# Patient Record
Sex: FEMALE | Race: WHITE | Employment: FULL TIME | URBAN - METROPOLITAN AREA
[De-identification: names, ages, dates, MRNs, and addresses within clinical notes are randomized per-mention and may not be internally consistent; named-entity substitution may affect disease eponyms.]

---

## 2023-03-16 DIAGNOSIS — E03.9 HYPOTHYROIDISM, UNSPECIFIED TYPE: Primary | ICD-10-CM

## 2023-03-16 RX ORDER — LEVOTHYROXINE SODIUM 25 UG/1
TABLET ORAL
Qty: 90 TABLET | Refills: 0 | Status: SHIPPED | OUTPATIENT
Start: 2023-03-16

## 2023-07-12 ENCOUNTER — OFFICE VISIT (OUTPATIENT)
Dept: PRIMARY CARE | Facility: CLINIC | Age: 53
End: 2023-07-12
Payer: COMMERCIAL

## 2023-07-12 VITALS
BODY MASS INDEX: 30.59 KG/M2 | HEIGHT: 60 IN | SYSTOLIC BLOOD PRESSURE: 102 MMHG | DIASTOLIC BLOOD PRESSURE: 80 MMHG | OXYGEN SATURATION: 98 % | TEMPERATURE: 97.9 F | WEIGHT: 155.8 LBS | HEART RATE: 73 BPM

## 2023-07-12 DIAGNOSIS — Z00.00 ROUTINE GENERAL MEDICAL EXAMINATION AT A HEALTH CARE FACILITY: Primary | ICD-10-CM

## 2023-07-12 PROBLEM — E03.9 HYPOTHYROIDISM: Status: ACTIVE | Noted: 2023-07-12

## 2023-07-12 PROBLEM — M10.9 GOUT: Status: ACTIVE | Noted: 2023-07-12

## 2023-07-12 PROCEDURE — 99396 PREV VISIT EST AGE 40-64: CPT | Performed by: FAMILY MEDICINE

## 2023-07-12 PROCEDURE — 1036F TOBACCO NON-USER: CPT | Performed by: FAMILY MEDICINE

## 2023-07-12 ASSESSMENT — ENCOUNTER SYMPTOMS
DYSURIA: 0
DIARRHEA: 0
NAUSEA: 0
NERVOUS/ANXIOUS: 0
PALPITATIONS: 0
HEADACHES: 0
DIZZINESS: 0
SLEEP DISTURBANCE: 0
ARTHRALGIAS: 0
CONSTIPATION: 0
FATIGUE: 0
LIGHT-HEADEDNESS: 0
SHORTNESS OF BREATH: 0
JOINT SWELLING: 0
ACTIVITY CHANGE: 0
VOMITING: 0
WOUND: 0
DYSPHORIC MOOD: 0
EYE PAIN: 0
APPETITE CHANGE: 0
SINUS PRESSURE: 0
BLOOD IN STOOL: 0
ABDOMINAL PAIN: 0
COUGH: 0

## 2023-07-12 ASSESSMENT — PATIENT HEALTH QUESTIONNAIRE - PHQ9
2. FEELING DOWN, DEPRESSED OR HOPELESS: NOT AT ALL
1. LITTLE INTEREST OR PLEASURE IN DOING THINGS: NOT AT ALL
SUM OF ALL RESPONSES TO PHQ9 QUESTIONS 1 AND 2: 0

## 2023-07-12 NOTE — PROGRESS NOTES
"Subjective   Patient ID: Debo Cisneros is a 53 y.o. female who presents for Annual Exam.    HPI   Pain mgmt- Dr. Gordon - following for disc issues and SI joint issues over the last few years - now prn- no recent visit   OB- dr sharma- last seen 6 months ago - pap and mammogram utd   Derm- [place]    Past medical surgical family and social hx updated in chart    Yoga, piliates, walking daily   Healthy diet, rare alcohol   Feeling good     Hypothyroidism  States she had recent labs by her OB/GYN that were within normal limits     Review of Systems   Constitutional:  Negative for activity change, appetite change and fatigue.   HENT:  Negative for congestion, postnasal drip and sinus pressure.    Eyes:  Negative for pain and visual disturbance.   Respiratory:  Negative for cough and shortness of breath.    Cardiovascular:  Negative for chest pain, palpitations and leg swelling.   Gastrointestinal:  Negative for abdominal pain, blood in stool, constipation, diarrhea, nausea and vomiting.   Endocrine: Negative for cold intolerance and heat intolerance.   Genitourinary:  Negative for dysuria and menstrual problem.   Musculoskeletal:  Negative for arthralgias and joint swelling.   Skin:  Negative for rash and wound.   Neurological:  Negative for dizziness, light-headedness and headaches.   Psychiatric/Behavioral:  Negative for dysphoric mood and sleep disturbance. The patient is not nervous/anxious.        Objective   /80   Pulse 73   Temp 36.6 °C (97.9 °F)   Ht 1.53 m (5' 0.25\")   Wt 70.7 kg (155 lb 12.8 oz)   SpO2 98%   BMI 30.18 kg/m²     Physical Exam  Vitals and nursing note reviewed.   Constitutional:       Appearance: Normal appearance.   HENT:      Head: Normocephalic and atraumatic.      Right Ear: Tympanic membrane, ear canal and external ear normal.      Left Ear: Tympanic membrane, ear canal and external ear normal.      Nose: Nose normal.      Mouth/Throat:      Mouth: Mucous membranes are moist. "      Pharynx: Oropharynx is clear.   Eyes:      Extraocular Movements: Extraocular movements intact.      Conjunctiva/sclera: Conjunctivae normal.      Pupils: Pupils are equal, round, and reactive to light.   Cardiovascular:      Rate and Rhythm: Normal rate and regular rhythm.      Pulses: Normal pulses.      Heart sounds: Normal heart sounds. No murmur heard.     No friction rub. No gallop.   Pulmonary:      Effort: Pulmonary effort is normal. No respiratory distress.      Breath sounds: Normal breath sounds.   Abdominal:      General: Abdomen is flat. Bowel sounds are normal.      Palpations: Abdomen is soft. There is no mass.      Tenderness: There is no abdominal tenderness. There is no rebound.   Musculoskeletal:         General: No swelling or deformity. Normal range of motion.      Cervical back: Normal range of motion and neck supple.   Lymphadenopathy:      Cervical: No cervical adenopathy.   Skin:     General: Skin is warm and dry.      Capillary Refill: Capillary refill takes less than 2 seconds.      Findings: No rash.   Neurological:      General: No focal deficit present.      Mental Status: She is alert and oriented to person, place, and time. Mental status is at baseline.      Cranial Nerves: No cranial nerve deficit.      Gait: Gait normal.      Deep Tendon Reflexes: Reflexes normal.   Psychiatric:         Mood and Affect: Mood normal.         Behavior: Behavior normal.         Thought Content: Thought content normal.         Judgment: Judgment normal.         Assessment/Plan   1. Routine general medical examination at a health care facility  update labs as elow above   Patient overall healthy weight and blood pressure  vaccines-will consider shingles  colonoscopy done 2019- next due 2029  mammogram and PAP UTD by gyn   Discussed age-appropriate anticipatory guidance  Encouraged yearly follow-up for full physical or sooner if needed  - CBC and Auto Differential; Future  - Lipid Panel; Future  -  Comprehensive Metabolic Panel; Future      Eugenio Byrd, DO

## 2023-07-12 NOTE — PATIENT INSTRUCTIONS
Recommendations for women annual wellness exam:  Make sure screenings for cervical, breast, and colon cancer are up to date if applicable- pap smears age 21-65, discuss mammogram starting at age 40, colonoscopy at age 45, earlier if positive family history for breast or colon cancer  Screen for osteoporosis with DXA bone scan starting at age 65 or sooner if risk factors present (long term steroid use, smoking, heavy alcohol use, history of fracture, rheumatoid arthritis, low body weight, family history of hip fracture)  Screening for lung cancer with low-dose CT in all adults age 55-77 who have a 30 pack-year smoking history and currently smoke or have quit within the past 15 years  Follow a healthy diet (Dash diet, Mediterranean diet)  Exercise 150 min/wk  Maintain healthy weight (BMI < 25)  Do not smoke  Alcohol in moderation (up to 1 drink/day)  Get enough sleep (7-8 hours/night)  Make sure immunizations are up to date (influenza, pneumococcal, Tdap, shingles if age > 50)  Postmenopausal women or women with osteoporosis need minimum 1,200 mg calcium and 800 IU vitamin D daily  Talk to your physician if you have concerns about depression or anxiety  Visit dentist twice yearly

## 2023-09-21 ENCOUNTER — LAB (OUTPATIENT)
Dept: LAB | Facility: LAB | Age: 53
End: 2023-09-21
Payer: COMMERCIAL

## 2023-09-21 DIAGNOSIS — Z00.00 ROUTINE GENERAL MEDICAL EXAMINATION AT A HEALTH CARE FACILITY: ICD-10-CM

## 2023-09-21 LAB
ALANINE AMINOTRANSFERASE (SGPT) (U/L) IN SER/PLAS: 22 U/L (ref 7–45)
ALBUMIN (G/DL) IN SER/PLAS: 4.8 G/DL (ref 3.4–5)
ALKALINE PHOSPHATASE (U/L) IN SER/PLAS: 70 U/L (ref 33–110)
ANION GAP IN SER/PLAS: 15 MMOL/L (ref 10–20)
ASPARTATE AMINOTRANSFERASE (SGOT) (U/L) IN SER/PLAS: 22 U/L (ref 9–39)
BASOPHILS (10*3/UL) IN BLOOD BY AUTOMATED COUNT: 0.06 X10E9/L (ref 0–0.1)
BASOPHILS/100 LEUKOCYTES IN BLOOD BY AUTOMATED COUNT: 0.9 % (ref 0–2)
BILIRUBIN TOTAL (MG/DL) IN SER/PLAS: 0.7 MG/DL (ref 0–1.2)
CALCIUM (MG/DL) IN SER/PLAS: 9.8 MG/DL (ref 8.6–10.3)
CARBON DIOXIDE, TOTAL (MMOL/L) IN SER/PLAS: 22 MMOL/L (ref 21–32)
CHLORIDE (MMOL/L) IN SER/PLAS: 108 MMOL/L (ref 98–107)
CHOLESTEROL (MG/DL) IN SER/PLAS: 215 MG/DL (ref 0–199)
CHOLESTEROL IN HDL (MG/DL) IN SER/PLAS: 77.6 MG/DL
CHOLESTEROL/HDL RATIO: 2.8
CREATININE (MG/DL) IN SER/PLAS: 0.82 MG/DL (ref 0.5–1.05)
EOSINOPHILS (10*3/UL) IN BLOOD BY AUTOMATED COUNT: 0.08 X10E9/L (ref 0–0.7)
EOSINOPHILS/100 LEUKOCYTES IN BLOOD BY AUTOMATED COUNT: 1.2 % (ref 0–6)
ERYTHROCYTE DISTRIBUTION WIDTH (RATIO) BY AUTOMATED COUNT: 12.1 % (ref 11.5–14.5)
ERYTHROCYTE MEAN CORPUSCULAR HEMOGLOBIN CONCENTRATION (G/DL) BY AUTOMATED: 34.1 G/DL (ref 32–36)
ERYTHROCYTE MEAN CORPUSCULAR VOLUME (FL) BY AUTOMATED COUNT: 98 FL (ref 80–100)
ERYTHROCYTES (10*6/UL) IN BLOOD BY AUTOMATED COUNT: 4.14 X10E12/L (ref 4–5.2)
GFR FEMALE: 85 ML/MIN/1.73M2
GLUCOSE (MG/DL) IN SER/PLAS: 96 MG/DL (ref 74–99)
HEMATOCRIT (%) IN BLOOD BY AUTOMATED COUNT: 40.5 % (ref 36–46)
HEMOGLOBIN (G/DL) IN BLOOD: 13.8 G/DL (ref 12–16)
IMMATURE GRANULOCYTES/100 LEUKOCYTES IN BLOOD BY AUTOMATED COUNT: 0.3 % (ref 0–0.9)
LDL: 121 MG/DL (ref 0–99)
LEUKOCYTES (10*3/UL) IN BLOOD BY AUTOMATED COUNT: 6.6 X10E9/L (ref 4.4–11.3)
LYMPHOCYTES (10*3/UL) IN BLOOD BY AUTOMATED COUNT: 2.63 X10E9/L (ref 1.2–4.8)
LYMPHOCYTES/100 LEUKOCYTES IN BLOOD BY AUTOMATED COUNT: 39.9 % (ref 13–44)
MONOCYTES (10*3/UL) IN BLOOD BY AUTOMATED COUNT: 0.48 X10E9/L (ref 0.1–1)
MONOCYTES/100 LEUKOCYTES IN BLOOD BY AUTOMATED COUNT: 7.3 % (ref 2–10)
NEUTROPHILS (10*3/UL) IN BLOOD BY AUTOMATED COUNT: 3.32 X10E9/L (ref 1.2–7.7)
NEUTROPHILS/100 LEUKOCYTES IN BLOOD BY AUTOMATED COUNT: 50.4 % (ref 40–80)
PLATELETS (10*3/UL) IN BLOOD AUTOMATED COUNT: 325 X10E9/L (ref 150–450)
POTASSIUM (MMOL/L) IN SER/PLAS: 4.1 MMOL/L (ref 3.5–5.3)
PROTEIN TOTAL: 7.6 G/DL (ref 6.4–8.2)
SODIUM (MMOL/L) IN SER/PLAS: 141 MMOL/L (ref 136–145)
TRIGLYCERIDE (MG/DL) IN SER/PLAS: 83 MG/DL (ref 0–149)
UREA NITROGEN (MG/DL) IN SER/PLAS: 13 MG/DL (ref 6–23)
VLDL: 17 MG/DL (ref 0–40)

## 2023-09-21 PROCEDURE — 80061 LIPID PANEL: CPT

## 2023-09-21 PROCEDURE — 85025 COMPLETE CBC W/AUTO DIFF WBC: CPT

## 2023-09-21 PROCEDURE — 36415 COLL VENOUS BLD VENIPUNCTURE: CPT

## 2023-09-21 PROCEDURE — 80053 COMPREHEN METABOLIC PANEL: CPT

## 2023-10-03 ENCOUNTER — TELEPHONE (OUTPATIENT)
Dept: PRIMARY CARE | Facility: CLINIC | Age: 53
End: 2023-10-03
Payer: COMMERCIAL

## 2023-11-09 ENCOUNTER — APPOINTMENT (OUTPATIENT)
Dept: PRIMARY CARE | Facility: CLINIC | Age: 53
End: 2023-11-09
Payer: COMMERCIAL

## 2024-02-23 ENCOUNTER — TELEPHONE (OUTPATIENT)
Dept: PRIMARY CARE | Facility: CLINIC | Age: 54
End: 2024-02-23
Payer: COMMERCIAL

## 2024-02-23 NOTE — TELEPHONE ENCOUNTER
----- Message from Eugenio Byrd DO sent at 2/23/2024  6:21 AM EST -----  Regarding: FW: Bloodwork/Test request  Contact: [phone]  Please offer pt a visit to discuss get this done and evaluating her symptoms   ----- Message -----  From: Tri Cota MA  Sent: 2/22/2024  11:58 AM EST  To: Eugenio Byrd DO  Subject: FW: Bloodwork/Test request                         ----- Message -----  From: Debo Cisneros  Sent: 2/22/2024  11:57 AM EST  To:  [id number] [place] [id number] Clinical Support Staff  Subject: Bloodwork/Test request                           Hi Dr. Byrd- I met with a local naturopath about my sudden weight gain and hormones. She recommended that I have the following tests and I was wondering if you could order these sooner rather than later because my next annual appointment with you isn’t until July:  CMP, CBC, Thyroid (TSH, TOT, T3,T4), Insulin, cholesterol,Vitamin D, AM cortisol, hscpp and testosterone free and total but I think you may not be able to do that one. If there are any others listed that you cannot do, just let me know. Additionally I mentioned to her that I’ve been having heart palpitations from time to time mostly at night when I lie down. Especially if I’ve had any alcohol… (a drink with dinner, not like out partying lol) which I’ve not had any alcohol for a couple of months but sometimes experiencing still at night. She asked that I let you know about this. Her name is Dr. Sumaya Sen. Thanks for any feedback you can provide! - Debo WEAVER

## 2024-04-29 ENCOUNTER — OFFICE VISIT (OUTPATIENT)
Dept: PRIMARY CARE | Facility: CLINIC | Age: 54
End: 2024-04-29
Payer: COMMERCIAL

## 2024-04-29 VITALS
BODY MASS INDEX: 32.23 KG/M2 | TEMPERATURE: 97.8 F | SYSTOLIC BLOOD PRESSURE: 120 MMHG | WEIGHT: 166.4 LBS | DIASTOLIC BLOOD PRESSURE: 80 MMHG

## 2024-04-29 DIAGNOSIS — E03.9 HYPOTHYROIDISM, UNSPECIFIED TYPE: Primary | ICD-10-CM

## 2024-04-29 DIAGNOSIS — R63.5 WEIGHT GAIN: ICD-10-CM

## 2024-04-29 PROCEDURE — 1036F TOBACCO NON-USER: CPT | Performed by: FAMILY MEDICINE

## 2024-04-29 PROCEDURE — 99214 OFFICE O/P EST MOD 30 MIN: CPT | Performed by: FAMILY MEDICINE

## 2024-04-29 ASSESSMENT — PATIENT HEALTH QUESTIONNAIRE - PHQ9
2. FEELING DOWN, DEPRESSED OR HOPELESS: NOT AT ALL
1. LITTLE INTEREST OR PLEASURE IN DOING THINGS: NOT AT ALL
2. FEELING DOWN, DEPRESSED OR HOPELESS: SEVERAL DAYS
SUM OF ALL RESPONSES TO PHQ9 QUESTIONS 1 AND 2: 0
1. LITTLE INTEREST OR PLEASURE IN DOING THINGS: NOT AT ALL
SUM OF ALL RESPONSES TO PHQ9 QUESTIONS 1 AND 2: 1

## 2024-04-29 ASSESSMENT — ENCOUNTER SYMPTOMS
SHORTNESS OF BREATH: 0
BLOOD IN STOOL: 0
WOUND: 0
HEADACHES: 0
SLEEP DISTURBANCE: 0
SINUS PRESSURE: 0
APPETITE CHANGE: 0
UNEXPECTED WEIGHT CHANGE: 1
EYE PAIN: 0
CONSTIPATION: 0
DYSPHORIC MOOD: 1
NERVOUS/ANXIOUS: 0
DYSURIA: 0
COUGH: 0
NAUSEA: 0
VOMITING: 0
FATIGUE: 0
JOINT SWELLING: 0
PALPITATIONS: 1
ACTIVITY CHANGE: 0
ABDOMINAL PAIN: 0
LIGHT-HEADEDNESS: 0
ARTHRALGIAS: 0
DIARRHEA: 0
DIZZINESS: 0

## 2024-04-29 NOTE — PROGRESS NOTES
Subjective   Patient ID: Debo Cisneros is a 53 y.o. female who presents for Weight Check (Recent weight gain).    HPI   Pain mgmt- Dr. Gordon - following for disc issues and SI joint issues over the last few years - now prn- no recent visit   OB- dr sharma- menopausal since 2017  Derm- [place]   Naturopath- had recent testing for menopausal support - on supplements for adrenals and menopause for a few weeks    Past medical surgical family and social hx updated in chart    Issues with wt gain   Has gained about 10 lbs last year  Motivation to workout has not been as good - doing yoga 2 times a week and walking the dog   Healthy diet, rare alcohol - no change recently   Tries to drink water   Coffee with almond milk no soda   Has had some increased stress and lack of motivation. Does not feel she is depressed but has been a little down over winter     Hypothyroidism  Taking levothyroxine 25 mcg every day     Review of Systems   Constitutional:  Positive for unexpected weight change. Negative for activity change, appetite change and fatigue.   HENT:  Negative for congestion, postnasal drip and sinus pressure.    Eyes:  Negative for pain and visual disturbance.   Respiratory:  Negative for cough and shortness of breath.    Cardiovascular:  Positive for palpitations (on occasion at rest after lg meal). Negative for chest pain and leg swelling.   Gastrointestinal:  Negative for abdominal pain, blood in stool, constipation, diarrhea, nausea and vomiting.   Endocrine: Negative for cold intolerance and heat intolerance.   Genitourinary:  Negative for dysuria and menstrual problem.   Musculoskeletal:  Negative for arthralgias and joint swelling.   Skin:  Negative for rash and wound.   Neurological:  Negative for dizziness, light-headedness and headaches.   Psychiatric/Behavioral:  Positive for dysphoric mood. Negative for sleep disturbance. The patient is not nervous/anxious.        Objective   /80   Temp 36.6 °C (97.8  °F)   Wt 75.5 kg (166 lb 6.4 oz)   BMI 32.23 kg/m²     Physical Exam  Vitals and nursing note reviewed.   Constitutional:       Appearance: Normal appearance.   HENT:      Head: Normocephalic and atraumatic.      Right Ear: Tympanic membrane, ear canal and external ear normal.      Left Ear: Tympanic membrane, ear canal and external ear normal.      Nose: Nose normal.      Mouth/Throat:      Mouth: Mucous membranes are moist.      Pharynx: Oropharynx is clear.   Eyes:      Extraocular Movements: Extraocular movements intact.      Conjunctiva/sclera: Conjunctivae normal.      Pupils: Pupils are equal, round, and reactive to light.   Cardiovascular:      Rate and Rhythm: Normal rate and regular rhythm.      Pulses: Normal pulses.      Heart sounds: Normal heart sounds. No murmur heard.     No friction rub. No gallop.   Pulmonary:      Effort: Pulmonary effort is normal. No respiratory distress.      Breath sounds: Normal breath sounds.   Abdominal:      General: Abdomen is flat. Bowel sounds are normal.      Palpations: Abdomen is soft. There is no mass.      Tenderness: There is no abdominal tenderness. There is no rebound.   Musculoskeletal:         General: No swelling or deformity. Normal range of motion.      Cervical back: Normal range of motion and neck supple.   Lymphadenopathy:      Cervical: No cervical adenopathy.   Skin:     General: Skin is warm and dry.      Capillary Refill: Capillary refill takes less than 2 seconds.      Findings: No rash.   Neurological:      General: No focal deficit present.      Mental Status: She is alert and oriented to person, place, and time. Mental status is at baseline.      Cranial Nerves: No cranial nerve deficit.      Gait: Gait normal.      Deep Tendon Reflexes: Reflexes normal.   Psychiatric:         Mood and Affect: Mood normal.         Behavior: Behavior normal.         Thought Content: Thought content normal.         Judgment: Judgment normal.          Assessment/Plan   1. Hypothyroidism, unspecified type  Lab due fu tbd   - TSH with reflex to Free T4 if abnormal; Future    2. Weight gain  Check labs fu tbd   - CBC and Auto Differential; Future  - Lipid Panel; Future  - Comprehensive Metabolic Panel; Future  - Hemoglobin A1C; Future        Eugenio Byrd, DO

## 2024-04-30 ENCOUNTER — LAB (OUTPATIENT)
Dept: LAB | Facility: LAB | Age: 54
End: 2024-04-30
Payer: COMMERCIAL

## 2024-04-30 DIAGNOSIS — E03.9 HYPOTHYROIDISM, UNSPECIFIED TYPE: ICD-10-CM

## 2024-04-30 DIAGNOSIS — R63.5 WEIGHT GAIN: ICD-10-CM

## 2024-04-30 LAB
ALBUMIN SERPL BCP-MCNC: 4.3 G/DL (ref 3.4–5)
ALP SERPL-CCNC: 76 U/L (ref 33–110)
ALT SERPL W P-5'-P-CCNC: 19 U/L (ref 7–45)
ANION GAP SERPL CALC-SCNC: 10 MMOL/L (ref 10–20)
AST SERPL W P-5'-P-CCNC: 19 U/L (ref 9–39)
BASOPHILS # BLD AUTO: 0.07 X10*3/UL (ref 0–0.1)
BASOPHILS NFR BLD AUTO: 0.9 %
BILIRUB SERPL-MCNC: 0.4 MG/DL (ref 0–1.2)
BUN SERPL-MCNC: 16 MG/DL (ref 6–23)
CALCIUM SERPL-MCNC: 9.4 MG/DL (ref 8.6–10.3)
CHLORIDE SERPL-SCNC: 109 MMOL/L (ref 98–107)
CHOLEST SERPL-MCNC: 173 MG/DL (ref 0–199)
CHOLESTEROL/HDL RATIO: 2.7
CO2 SERPL-SCNC: 26 MMOL/L (ref 21–32)
CREAT SERPL-MCNC: 0.74 MG/DL (ref 0.5–1.05)
EGFRCR SERPLBLD CKD-EPI 2021: >90 ML/MIN/1.73M*2
EOSINOPHIL # BLD AUTO: 0.16 X10*3/UL (ref 0–0.7)
EOSINOPHIL NFR BLD AUTO: 2.1 %
ERYTHROCYTE [DISTWIDTH] IN BLOOD BY AUTOMATED COUNT: 12.5 % (ref 11.5–14.5)
GLUCOSE SERPL-MCNC: 85 MG/DL (ref 74–99)
HCT VFR BLD AUTO: 40.4 % (ref 36–46)
HDLC SERPL-MCNC: 63.4 MG/DL
HGB BLD-MCNC: 13.2 G/DL (ref 12–16)
IMM GRANULOCYTES # BLD AUTO: 0.04 X10*3/UL (ref 0–0.7)
IMM GRANULOCYTES NFR BLD AUTO: 0.5 % (ref 0–0.9)
LDLC SERPL CALC-MCNC: 93 MG/DL
LYMPHOCYTES # BLD AUTO: 2.79 X10*3/UL (ref 1.2–4.8)
LYMPHOCYTES NFR BLD AUTO: 36 %
MCH RBC QN AUTO: 32.7 PG (ref 26–34)
MCHC RBC AUTO-ENTMCNC: 32.7 G/DL (ref 32–36)
MCV RBC AUTO: 100 FL (ref 80–100)
MONOCYTES # BLD AUTO: 0.58 X10*3/UL (ref 0.1–1)
MONOCYTES NFR BLD AUTO: 7.5 %
NEUTROPHILS # BLD AUTO: 4.12 X10*3/UL (ref 1.2–7.7)
NEUTROPHILS NFR BLD AUTO: 53 %
NON HDL CHOLESTEROL: 110 MG/DL (ref 0–149)
NRBC BLD-RTO: 0 /100 WBCS (ref 0–0)
PLATELET # BLD AUTO: 333 X10*3/UL (ref 150–450)
POTASSIUM SERPL-SCNC: 4.3 MMOL/L (ref 3.5–5.3)
PROT SERPL-MCNC: 7.1 G/DL (ref 6.4–8.2)
RBC # BLD AUTO: 4.04 X10*6/UL (ref 4–5.2)
SODIUM SERPL-SCNC: 141 MMOL/L (ref 136–145)
TRIGL SERPL-MCNC: 83 MG/DL (ref 0–149)
TSH SERPL-ACNC: 2.48 MIU/L (ref 0.44–3.98)
VLDL: 17 MG/DL (ref 0–40)
WBC # BLD AUTO: 7.8 X10*3/UL (ref 4.4–11.3)

## 2024-04-30 PROCEDURE — 85025 COMPLETE CBC W/AUTO DIFF WBC: CPT

## 2024-04-30 PROCEDURE — 36415 COLL VENOUS BLD VENIPUNCTURE: CPT

## 2024-04-30 PROCEDURE — 83036 HEMOGLOBIN GLYCOSYLATED A1C: CPT

## 2024-04-30 PROCEDURE — 80061 LIPID PANEL: CPT

## 2024-04-30 PROCEDURE — 84443 ASSAY THYROID STIM HORMONE: CPT

## 2024-04-30 PROCEDURE — 80053 COMPREHEN METABOLIC PANEL: CPT

## 2024-05-02 LAB
EST. AVERAGE GLUCOSE BLD GHB EST-MCNC: 108 MG/DL
HBA1C MFR BLD: 5.4 %

## 2024-07-17 ENCOUNTER — APPOINTMENT (OUTPATIENT)
Dept: PRIMARY CARE | Facility: CLINIC | Age: 54
End: 2024-07-17
Payer: COMMERCIAL

## 2024-08-07 ENCOUNTER — APPOINTMENT (OUTPATIENT)
Dept: PRIMARY CARE | Facility: CLINIC | Age: 54
End: 2024-08-07
Payer: COMMERCIAL

## 2024-08-31 ENCOUNTER — TELEMEDICINE (OUTPATIENT)
Dept: PRIMARY CARE | Facility: CLINIC | Age: 54
End: 2024-08-31
Payer: COMMERCIAL

## 2024-08-31 DIAGNOSIS — U07.1 COVID: Primary | ICD-10-CM

## 2024-08-31 PROCEDURE — 99214 OFFICE O/P EST MOD 30 MIN: CPT | Performed by: FAMILY MEDICINE

## 2024-08-31 RX ORDER — BENZONATATE 200 MG/1
200 CAPSULE ORAL 3 TIMES DAILY PRN
Qty: 42 CAPSULE | Refills: 0 | Status: SHIPPED | OUTPATIENT
Start: 2024-08-31 | End: 2024-09-30

## 2024-08-31 RX ORDER — PROGESTERONE 100 MG/1
100 CAPSULE ORAL
COMMUNITY
Start: 2024-06-04

## 2024-08-31 NOTE — PATIENT INSTRUCTIONS
COVID +  Paxlovid discussed--how to take and how it works and adverse effects--paxlovid prescribed? Yes  Benzonatate as needed--  Discussed stopping statin x 7 days--N/A   Kidney function reviewed --nl    Please send me a [facility] message if you have any questions or concerns.  FOR NON URGENT questions only.  Allow up to 72 hours for response.    If you have prescription issues or other questions you can email   Ivory Cade  Digital Health Coordinator, at   [email or web address]     Rest and drink plenty of fluids    Tylenol and or motrin as needed for pain and fever (unless you have been told not to take these because of your personal medical history)    Discussed options and precautions (complaint specific and may include)  Viral versus bacterial infection; use of medications; possible side effects; appropriate over-the-counter medications; possible complications and /or when to follow-up.    Follow-up in 1 to 2 days if not improving.  Follow-up immediately if symptoms worsen.    All red flags requiring in person care were discussed.  All patient's questions were answered.    To connect with a new PCP please visit [email or web address] or call [phone]     If experiencing any severe or worsening symptoms including but not limited to lethargy / chest pain / weakness / dizziness / difficulty breathing please call 911 or go to the emergency department for immediate care!    Limitations to telemedicine include inability to do a complete and accurate physical exam.  Any concerns regarding this were conveyed with the patient and in person follow-up recommended if patient nature of illness does not progress as anticipated during this visit.    CDC updated Respiratory Infection guidelines:   When you have a respiratory virus, stay home and away from others (including people  you live with who are not sick) Symptoms can include fever, chills, fatigue, cough,  runny nose, and  headache (and others).    You can go back to your normal activities when,   for at least 24 hours,   BOTH are true:    1) Your symptoms are getting better overall  2) You have not had a fever (and are not using fever-reducing medication).    When you go back to your normal activities, take added precaution over the next 5 days, such as taking additional steps for  air, hygiene, masks, physical distancing, and/or testing when you will be around other people indoors.    Keep in mind that you may still be able to spread the virus that made you sick, even if you are feeling better. You are likely to be less contagious at this time, depending on factors like how long you were sick or how sick you were.    If you develop a fever or you start to feel worse after you have gone back to normal activities, stay home and away from others again until, for at least 24 hours, both are true: your symptoms are improving overall, and you have not had a fever (and are not using fever-reducing medication). Then take added precaution for the next 5 days.    If you never had symptoms but tested positive for a respiratory virus?, you may be contagious. For the next 5 days: take added precaution, such as taking additional steps for  air, hygiene, masks, physical distancing, and/or testing when you will be around other people indoors. This is especially important to protect people with factors that increase their risk of severe illness from respiratory viruses.  Avoid immunocompromised, elderly, pregnant women, infants etc    Have a low threshold for in person evaluation if your symptoms worsen.

## 2024-08-31 NOTE — PROGRESS NOTES
I performed this visit using realtime telehealth tools, including an audio/video OR telephone connection between the patient listed who was located in the [facility] and myself, Lucina Nelson (Board certified in the [facility]).  At the start of the visit, I introduced myself as Dr. Ortega and verified the patients name, , and current physical location.    If they were currently outside of the state of [place], the visit was ended and the patient was referred to alternative means for evaluation and treatment.   The patient was made aware of the limitations of the telehealth visit.  They will not be physically examined and all issues may not be appropriate for a telehealth visit.  If necessary, an in person referral will be made.      DISCLAIMER:   In preparing for this visit and writing this note, I reviewed previous electronic medical records (labs, imaging and medical charts) of the patient available in the physician portal. Significant findings which helped in decision making are recorded in this encounter charting.    All allergies were reviewed with the patient and all medications reconciled with the patient.    COVID +  Notified Monday was in contact with someone who tested +  Sxs started between Tuesday and Wednesday  Symptoms started with scratchy throat  Wednesday congestion and sinus sxs  Then cough  No wheeze, SOB, or chest pain  Chest sore from coughing  Fever on Thursday ~--with chills and aches  Still with aches  No GI sxs  Congestion and cough are the worst sxs  Decreased appetite  Good fluids  OTC--sudafed --last night theraflu hot liquid     COVID hx-- [place]  and   NOT VAXXED-- MTHFR gene variant--     All other ROS (-)    Alert in NAD  Eyes clear  No resp distress or coughing  Affect wnl    COVID +  Paxlovid discussed--how to take and how it works and adverse effects--paxlovid prescribed? Yes  Benzonatate as needed--  Discussed stopping statin x 7 days--N/A   Kidney  function reviewed --nl    Telemedicine limits the ability to do a complete and accurate physical exam.     At the completion of the visit, possible diagnoses and plan was discussed with the patient as well as recommended treatments, medications prescribed, and when to follow up for in person evaluation. All questions were answered and the patient verbalized understanding.

## 2024-09-16 DIAGNOSIS — E03.9 HYPOTHYROIDISM, UNSPECIFIED TYPE: ICD-10-CM

## 2024-09-16 RX ORDER — LEVOTHYROXINE SODIUM 25 UG/1
25 TABLET ORAL DAILY
Qty: 90 TABLET | Refills: 0 | Status: SHIPPED | OUTPATIENT
Start: 2024-09-16

## 2024-11-08 ENCOUNTER — OFFICE VISIT (OUTPATIENT)
Dept: PRIMARY CARE | Facility: CLINIC | Age: 54
End: 2024-11-08
Payer: COMMERCIAL

## 2024-11-08 VITALS
OXYGEN SATURATION: 98 % | HEART RATE: 78 BPM | BODY MASS INDEX: 32.21 KG/M2 | DIASTOLIC BLOOD PRESSURE: 83 MMHG | WEIGHT: 170.6 LBS | SYSTOLIC BLOOD PRESSURE: 128 MMHG | HEIGHT: 61 IN | TEMPERATURE: 98 F

## 2024-11-08 DIAGNOSIS — E07.9 THYROID LUMP: Primary | ICD-10-CM

## 2024-11-08 PROCEDURE — 1036F TOBACCO NON-USER: CPT | Performed by: NURSE PRACTITIONER

## 2024-11-08 PROCEDURE — 99213 OFFICE O/P EST LOW 20 MIN: CPT | Performed by: NURSE PRACTITIONER

## 2024-11-08 PROCEDURE — 3008F BODY MASS INDEX DOCD: CPT | Performed by: NURSE PRACTITIONER

## 2024-11-08 ASSESSMENT — ENCOUNTER SYMPTOMS
FATIGUE: 0
FEVER: 0
TROUBLE SWALLOWING: 1
RHINORRHEA: 0
CHILLS: 0
COUGH: 0
SORE THROAT: 0

## 2024-11-08 ASSESSMENT — PATIENT HEALTH QUESTIONNAIRE - PHQ9
2. FEELING DOWN, DEPRESSED OR HOPELESS: NOT AT ALL
SUM OF ALL RESPONSES TO PHQ9 QUESTIONS 1 & 2: 0
1. LITTLE INTEREST OR PLEASURE IN DOING THINGS: NOT AT ALL

## 2024-11-08 NOTE — PROGRESS NOTES
"Subjective   Debo Cisneros is a 54 y.o. female who presents for Mass (Throat/neck, first noticed about a week ago, it is toward the middle of throat but off to right side. Denies painful just discomfort).    HPI  She presents to the office today for evaluation of a lump to the right side of the neck- in the center and off to the right.  First noticed it about a week ago.(+) achy and soreness  It was first noticed with movement of neck and with touch.  It has not gotten bigger.  No fever or chills.  No runny nose, sore throat, PND or cough.  Soreness in the neck with swallowing.  Does not feel as though things are getting stuck.   Over the past couple of years has noticed some issues with swallowing- throat gets dry.  Has been having to chew food better to get it to do down when it happens.    Feels she had something similar a few years ago and had an US done but does not recall the results- believes was told she had a goiter.     Review of Systems   Constitutional:  Negative for chills, fatigue and fever.   HENT:  Positive for trouble swallowing. Negative for congestion, ear pain, postnasal drip, rhinorrhea and sore throat.    Respiratory:  Negative for cough.        Objective   /83 (BP Location: Left arm, Patient Position: Sitting, BP Cuff Size: Adult)   Pulse 78   Temp 36.7 °C (98 °F) (Temporal)   Ht 1.549 m (5' 1\")   Wt 77.4 kg (170 lb 9.6 oz)   SpO2 98%   BMI 32.23 kg/m²     Physical Exam  Constitutional:       General: She is not in acute distress.     Appearance: Normal appearance. She is not toxic-appearing.   Neck:      Thyroid: Thyroid mass and thyroid tenderness present.        Comments: (+) nickel sized lump noted to the right side of thyroid (+) tender upon palpation  Cardiovascular:      Rate and Rhythm: Normal rate and regular rhythm.      Heart sounds: Normal heart sounds, S1 normal and S2 normal. No murmur heard.  Pulmonary:      Effort: Pulmonary effort is normal. No respiratory " distress.      Breath sounds: Normal breath sounds and air entry.   Lymphadenopathy:      Cervical: No cervical adenopathy.   Neurological:      Mental Status: She is alert and oriented to person, place, and time.   Psychiatric:         Attention and Perception: Attention normal.         Mood and Affect: Mood and affect normal.         Behavior: Behavior normal. Behavior is cooperative.         Thought Content: Thought content normal.         Cognition and Memory: Cognition normal.         Judgment: Judgment normal.         Assessment/Plan   Problem List Items Addressed This Visit    None  Visit Diagnoses       Thyroid lump    -  Primary    Relevant Orders    US thyroid          It has been a pleasure seeing you today!

## 2024-11-14 ENCOUNTER — HOSPITAL ENCOUNTER (OUTPATIENT)
Dept: RADIOLOGY | Facility: CLINIC | Age: 54
Discharge: HOME | End: 2024-11-14
Payer: COMMERCIAL

## 2024-11-14 DIAGNOSIS — E07.9 THYROID LUMP: ICD-10-CM

## 2024-11-14 PROCEDURE — 76536 US EXAM OF HEAD AND NECK: CPT | Performed by: RADIOLOGY

## 2024-11-14 PROCEDURE — 76536 US EXAM OF HEAD AND NECK: CPT

## 2024-11-20 DIAGNOSIS — R59.0 LYMPHADENOPATHY, SUBMENTAL: Primary | ICD-10-CM

## 2024-11-22 ENCOUNTER — LAB (OUTPATIENT)
Dept: LAB | Facility: LAB | Age: 54
End: 2024-11-22
Payer: COMMERCIAL

## 2024-11-22 DIAGNOSIS — R59.0 LYMPHADENOPATHY, SUBMENTAL: ICD-10-CM

## 2024-11-22 LAB
ANION GAP SERPL CALC-SCNC: 13 MMOL/L (ref 10–20)
BUN SERPL-MCNC: 15 MG/DL (ref 6–23)
CALCIUM SERPL-MCNC: 9.6 MG/DL (ref 8.6–10.3)
CHLORIDE SERPL-SCNC: 108 MMOL/L (ref 98–107)
CO2 SERPL-SCNC: 25 MMOL/L (ref 21–32)
CREAT SERPL-MCNC: 0.73 MG/DL (ref 0.5–1.05)
EGFRCR SERPLBLD CKD-EPI 2021: >90 ML/MIN/1.73M*2
GLUCOSE SERPL-MCNC: 85 MG/DL (ref 74–99)
POTASSIUM SERPL-SCNC: 4 MMOL/L (ref 3.5–5.3)
SODIUM SERPL-SCNC: 142 MMOL/L (ref 136–145)

## 2024-11-22 PROCEDURE — 36415 COLL VENOUS BLD VENIPUNCTURE: CPT

## 2024-11-22 PROCEDURE — 80048 BASIC METABOLIC PNL TOTAL CA: CPT

## 2024-11-25 ENCOUNTER — HOSPITAL ENCOUNTER (OUTPATIENT)
Dept: RADIOLOGY | Facility: HOSPITAL | Age: 54
Discharge: HOME | End: 2024-11-25
Payer: COMMERCIAL

## 2024-11-25 DIAGNOSIS — R93.89 ABNORMAL CT SCAN, NECK: Primary | ICD-10-CM

## 2024-11-25 DIAGNOSIS — R59.0 LYMPHADENOPATHY, SUBMENTAL: ICD-10-CM

## 2024-11-25 PROCEDURE — 2550000001 HC RX 255 CONTRASTS: Performed by: NURSE PRACTITIONER

## 2024-11-25 PROCEDURE — 70491 CT SOFT TISSUE NECK W/DYE: CPT

## 2024-11-25 PROCEDURE — 70491 CT SOFT TISSUE NECK W/DYE: CPT | Performed by: RADIOLOGY

## 2024-12-02 ENCOUNTER — APPOINTMENT (OUTPATIENT)
Dept: OTOLARYNGOLOGY | Facility: CLINIC | Age: 54
End: 2024-12-02
Payer: COMMERCIAL

## 2024-12-02 DIAGNOSIS — R93.89 ABNORMAL CT SCAN, NECK: ICD-10-CM

## 2024-12-02 DIAGNOSIS — Q89.2 THYROGLOSSAL DUCT CYST: Primary | ICD-10-CM

## 2024-12-02 PROCEDURE — 99202 OFFICE O/P NEW SF 15 MIN: CPT | Performed by: OTOLARYNGOLOGY

## 2024-12-02 ASSESSMENT — ENCOUNTER SYMPTOMS
STRIDOR: 0
SORE THROAT: 0
SHORTNESS OF BREATH: 0
FACIAL SWELLING: 0
NAUSEA: 0
UNEXPECTED WEIGHT CHANGE: 0
VOICE CHANGE: 0
APPETITE CHANGE: 0
TROUBLE SWALLOWING: 0
COUGH: 0
FATIGUE: 0
CHILLS: 0
VOMITING: 0
ADENOPATHY: 0
NECK PAIN: 0
FEVER: 0

## 2024-12-02 NOTE — ASSESSMENT & PLAN NOTE
I personally reviewed her CT neck and thyroid US  Imaging c/w thyroglossal duct cyst which likely became inflamed after her dental work  I did explain the diagnosis of TGDC and the plans for evaluation and treatment of the problem.    Discussed treatment options and that usually I recommend surgical removal via sistrunk procedure.  She is improved and is now largely asymptomatic.  We also discussed observation however I don't know if another episode will occur.  She is going to consider these options and get back to me.  I answered all of the patients questions.  They did appear to understand and confirmed this, and do agree to proceed as outlined above.

## 2024-12-02 NOTE — PROGRESS NOTES
"Subjective   Patient ID: Debo Cisneros is a 54 y.o. female who presents for Neck Mass.  She had an episode of neck swelling, soreness/tenderness.  This was located above her thyroid.  Prior to this occurring she had dental work done.  She could feel a lump in her neck.  She was seen by her PCP NP and a thyroid US was ordered and obtained 11/14/24.  This showed a normal thyroid bilaterally with a small 8mm right thyroid nodule (TR3).  In addition there was a 9mm hypoechoic nodule in the submental region noted.  She had a CT neck w/contrast 11/25/24.  I personally reviewed this which shows a cystic nodule adjacent to the hyoid just off midline to the right which appears c/w a thyroglossal duct cyst (agreed upon by the radiologist as well).  She was therefore referred to me.  She reports her neck swelling has decreased.  Not as sore.  No voice changes.  Denies odynophagia, dysphagia or otalgia. Tolerating a regular diet.  Denies weight loss.  No fevers/chills.  No night sweats.   HPI    Review of Systems   Constitutional:  Negative for appetite change, chills, fatigue, fever and unexpected weight change.   HENT:  Negative for dental problem, drooling, ear pain, facial swelling, hearing loss, mouth sores, sore throat, tinnitus, trouble swallowing and voice change.    Respiratory:  Negative for cough, shortness of breath and stridor.    Gastrointestinal:  Negative for nausea and vomiting.   Musculoskeletal:  Negative for neck pain.   Hematological:  Negative for adenopathy.       Objective   Physical Exam  CONSTITUTIONAL:  Vitals reviewed in nursing chart.  (Height 5'1\" weight 170lbs respirations 12)  CONSTITUTIONAL:  Well developed, well nourished.    CONSTITUTIONAL:  Normal ability to communicate.  Voice quality normal.  RESPIRATION:  Breathing comfortably, no stridor.  EYES:  Extraocular movement's intact, sclera normal.  NEURO:  Alert and oriented times 3.    PSYCH:  Mood and affect appropriate.  ENT - NOSE / " EARS:  External inspection normal.  ENT - Lips / teeth / gums:  No concerning lesions.  HEAD AND FACE:  Skin with no masses or lesions.    HEAD AND FACE:  Assessment of facial strength - CN VII symmetric and normal.  NECK:  Symmetric. No visible neck swelling.  She points to the area near the hyoid. No concerning skin lesions.     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    Discussed treatment options and that usually I recommend surgical removal via sistrunk procedure.  She is improved and is now largely asymptomatic.  We also discussed observation however I don't know if another episode will occur.  She is going to consider these options and get back to me.  I answered all of the patients questions.  They did appear to understand and confirmed this, and do agree to proceed as outlined above.           Other Visit Diagnoses         Codes    Abnormal CT scan, neck     R93.89             Ngoc Bojorquez MD    Head & Neck Surgical Oncology & Reconstruction  Department of Otolaryngology - Head and Neck Surgery

## 2025-09-17 ENCOUNTER — APPOINTMENT (OUTPATIENT)
Dept: PRIMARY CARE | Facility: CLINIC | Age: 55
End: 2025-09-17
Payer: COMMERCIAL

## 2025-09-29 ENCOUNTER — APPOINTMENT (OUTPATIENT)
Dept: PRIMARY CARE | Facility: CLINIC | Age: 55
End: 2025-09-29
Payer: COMMERCIAL